# Patient Record
Sex: FEMALE | Race: WHITE | NOT HISPANIC OR LATINO | Employment: FULL TIME | URBAN - METROPOLITAN AREA
[De-identification: names, ages, dates, MRNs, and addresses within clinical notes are randomized per-mention and may not be internally consistent; named-entity substitution may affect disease eponyms.]

---

## 2017-02-02 PROCEDURE — 99283 EMERGENCY DEPT VISIT LOW MDM: CPT

## 2017-02-02 RX ORDER — VALSARTAN AND HYDROCHLOROTHIAZIDE 160; 25 MG/1; MG/1
1 TABLET ORAL DAILY
COMMUNITY

## 2017-02-03 ENCOUNTER — HOSPITAL ENCOUNTER (EMERGENCY)
Facility: HOSPITAL | Age: 52
Discharge: HOME | End: 2017-02-03
Attending: EMERGENCY MEDICINE | Admitting: EMERGENCY MEDICINE

## 2017-02-03 VITALS
WEIGHT: 160 LBS | DIASTOLIC BLOOD PRESSURE: 84 MMHG | TEMPERATURE: 96.9 F | HEART RATE: 69 BPM | BODY MASS INDEX: 26.66 KG/M2 | SYSTOLIC BLOOD PRESSURE: 131 MMHG | OXYGEN SATURATION: 98 % | RESPIRATION RATE: 18 BRPM | HEIGHT: 65 IN

## 2017-02-03 DIAGNOSIS — S01.81XA FOREHEAD LACERATION, INITIAL ENCOUNTER: Primary | ICD-10-CM

## 2017-02-03 NOTE — DISCHARGE INSTRUCTIONS
Keep laceration clean and dry, apply antibiotic ointment once or twice daily, watch carefully for signs of infection, sutures need to be removed in 7 days. Return to care if any complications.

## 2017-02-03 NOTE — ED PROVIDER NOTES
HPI  Pt presents complaining of head laceration secondary to fall in shower. Pt denies chest pain, SOA, and any other sx at this time.     Physical Exam  There is a left supraorbital laceration that is repaired with good approximation. Heart and lungs are normal. Pt is in NAD.     Progress  Pt will be discharged and f/u with PCP in 5-7 days for suture removal.     I supervised care provided by the midlevel provider.    We have discussed this patient's history, physical exam, and treatment plan.   I have reviewed the note and personally saw and examined the patient and agree with the plan of care.    Documentation assistance provided by armen Groves.  Information recorded by the scribe was done at my direction and has been verified and validated by me.       Lela Groves  02/03/17 0209       Rolando So MD  02/03/17 0701

## 2017-02-03 NOTE — ED NOTES
Per ems- pt tripped in bathroom and hit head on bathtub. Pt has lac on head with no active bleeding. No LOC. Pt not on blood thinners. Pt also reports sore muscle to left leg.      Asya Yanez RN  02/02/17 8614

## 2017-02-03 NOTE — ED PROVIDER NOTES
EMERGENCY DEPARTMENT ENCOUNTER    CHIEF COMPLAINT  Chief Complaint: Fall, head laceration  History given by: patient   History limited by: n/a  Room Number:   PMD: Provider Not In System      HPI:  Pt is a 51 y.o. female who presents complaining of a head laceration secondary to a fall. Pt states that she slipped and fell while in the shower. She complains of a headache, neck pain, right leg pain, and nausea. Pt states that she has been ambulatory after the fall. Tetanus is up to date.    Duration:  Prior to arrival  Onset: sudden  Timing: constant  Location: forehead  Radiation: n/a  Quality: laceration  Intensity/Severity: mild  Progression: unchanged  Associated Symptoms: headache, neck pain, RLE pain  Aggravating Factors: none  Alleviating Factors: none  Previous Episodes: none  Treatment before arrival: none    PAST MEDICAL HISTORY  Active Ambulatory Problems     Diagnosis Date Noted   • No Active Ambulatory Problems     Resolved Ambulatory Problems     Diagnosis Date Noted   • No Resolved Ambulatory Problems     Past Medical History   Diagnosis Date   • Hypertension        PAST SURGICAL HISTORY  Past Surgical History   Procedure Laterality Date   • Hysterectomy     • Sinus surgery     • Laminectomy     •  section     • Breast surgery     • Stomach surgery         FAMILY HISTORY  History reviewed. No pertinent family history.    SOCIAL HISTORY  Social History     Social History   • Marital status:      Spouse name: N/A   • Number of children: N/A   • Years of education: N/A     Occupational History   • Not on file.     Social History Main Topics   • Smoking status: Never Smoker   • Smokeless tobacco: Not on file   • Alcohol use Yes      Comment: ocassional   • Drug use: No   • Sexual activity: Defer     Other Topics Concern   • Not on file     Social History Narrative   • No narrative on file       ALLERGIES  Review of patient's allergies indicates no known allergies.    REVIEW OF  SYSTEMS  Review of Systems   Constitutional: Negative for chills and fever.   HENT: Negative for sore throat.    Respiratory: Negative for cough.    Gastrointestinal: Negative for nausea and vomiting.   Genitourinary: Negative for dysuria.   Musculoskeletal: Positive for myalgias (RLE) and neck pain. Negative for back pain.   Skin: Positive for wound (forehead laceration).   Psychiatric/Behavioral: The patient is not nervous/anxious.        PHYSICAL EXAM  ED Triage Vitals   Temp Heart Rate Resp BP SpO2   02/02/17 2204 02/02/17 2203 02/02/17 2203 02/02/17 2203 02/02/17 2204   96.5 °F (35.8 °C) 98 18 126/77 98 %      Temp src Heart Rate Source Patient Position BP Location FiO2 (%)   02/02/17 2204 -- 02/02/17 2203 02/02/17 2203 --   Tympanic  Lying Right arm        Physical Exam   Constitutional: She is oriented to person, place, and time and well-developed, well-nourished, and in no distress.   HENT:   Head: Head is with laceration (2.5 cm lac to the right forehead.  ).   Right Ear: No hemotympanum.   Left Ear: No hemotympanum.   No malocclusion    Eyes: EOM are normal.   Neck: Normal range of motion.   Cardiovascular: Normal rate and regular rhythm.    Pulmonary/Chest: Effort normal and breath sounds normal. No respiratory distress.   Neurological: She is alert and oriented to person, place, and time. She has normal sensation and normal strength.   No focal neuro deficit   Skin: Skin is warm and dry.   Psychiatric: Affect normal.   Nursing note and vitals reviewed.    PROCEDURES  Laceration Repair  Date/Time: 2/3/2017 1:32 AM  Performed by: MARIE LAWRENCE.  Authorized by: MARIE LAWRENCE   Consent: Verbal consent obtained.  Risks and benefits: risks, benefits and alternatives were discussed  Consent given by: patient  Patient understanding: patient states understanding of the procedure being performed  Patient consent: the patient's understanding of the procedure matches consent given  Procedure consent: procedure  consent matches procedure scheduled  Relevant documents: relevant documents present and verified  Test results: test results available and properly labeled  Site marked: the operative site was marked  Required items: required blood products, implants, devices, and special equipment available  Patient identity confirmed: verbally with patient  Body area: head/neck  Location details: forehead  Laceration length: 2.5 cm  Foreign bodies: no foreign bodies  Tendon involvement: none  Nerve involvement: none  Vascular damage: no  Anesthesia: local infiltration    Anesthesia:  Anesthesia: local infiltration  Local Anesthetic: lidocaine 1% with epinephrine   Anesthetic total: 2 mL  Preparation: Patient was prepped and draped in the usual sterile fashion.  Irrigation solution: saline (hibaclens)  Irrigation method: jet lavage  Amount of cleaning: standard  Skin closure: 6-0 nylon  Number of sutures: 9  Technique: running  Approximation: close  Approximation difficulty: simple  Patient tolerance: Patient tolerated the procedure well with no immediate complications            PROGRESS AND CONSULTS  ED Course     01:32  Laceration repaired. Pt tolerated the procedure well with no immediate complication. Pt will be discharged. Pt understands and agrees with the plan, all questions answered.    02:00  Discussed pt with Dr. So who agrees with plan of care.     MEDICAL DECISION MAKING  Results were reviewed/discussed with the patient and they were also made aware of online access. Pt also made aware that some labs, such as cultures, will not be resulted during ER visit and follow up with PMD is necessary.     MDM       DIAGNOSIS  Final diagnoses:   Forehead laceration, initial encounter       DISPOSITION  DISCHARGE    Patient discharged in stable condition.    Reviewed implications of results, diagnosis, meds, responsibility to follow up, warning signs and symptoms of possible worsening, potential complications and reasons to  return to ER.    Patient/Family voiced understanding of above instructions.    Discussed plan for discharge, as there is no emergent indication for admission.  Pt/family is agreeable and understands need for follow up and repeat testing.  Pt is aware that discharge does not mean that nothing is wrong but it indicates no emergency is present that requires admission and they must continue care with follow-up as given below or physician of their choice.     FOLLOW-UP  Your doctor    In 1 week  For suture removal         Medication List      Notice     No changes were made to your prescriptions during this visit.        Latest Documented Vital Signs:  As of 2:04 AM  BP- 126/77 HR- 98 Temp- 96.5 °F (35.8 °C) (Tympanic) O2 sat- 98%    --  Documentation assistance provided by armen Lira for Missael Castaneda PA-C.  Information recorded by the scribe was done at my direction and has been verified and validated by me.     Lilliam Lira  02/03/17 0159       JAGRUTI Iqbal  02/03/17 0204